# Patient Record
Sex: MALE | Race: AMERICAN INDIAN OR ALASKA NATIVE | HISPANIC OR LATINO | RURAL
[De-identification: names, ages, dates, MRNs, and addresses within clinical notes are randomized per-mention and may not be internally consistent; named-entity substitution may affect disease eponyms.]

---

## 2023-08-13 ENCOUNTER — HOSPITAL ENCOUNTER (EMERGENCY)
Facility: HOSPITAL | Age: 43
Discharge: HOME | End: 2023-08-13
Attending: EMERGENCY MEDICINE
Payer: COMMERCIAL

## 2023-08-13 VITALS
DIASTOLIC BLOOD PRESSURE: 102 MMHG | BODY MASS INDEX: 31.08 KG/M2 | HEART RATE: 104 BPM | WEIGHT: 222 LBS | HEIGHT: 71 IN | TEMPERATURE: 99 F | RESPIRATION RATE: 16 BRPM | SYSTOLIC BLOOD PRESSURE: 157 MMHG | OXYGEN SATURATION: 97 %

## 2023-08-13 DIAGNOSIS — G51.0 BELL'S PALSY: Primary | ICD-10-CM

## 2023-08-13 PROCEDURE — 99284 EMERGENCY DEPT VISIT MOD MDM: CPT

## 2023-08-13 PROCEDURE — 99283 PR EMERGENCY DEPT VISIT,LEVEL III: ICD-10-PCS | Mod: ,,, | Performed by: EMERGENCY MEDICINE

## 2023-08-13 PROCEDURE — 99283 EMERGENCY DEPT VISIT LOW MDM: CPT | Mod: ,,, | Performed by: EMERGENCY MEDICINE

## 2023-08-13 RX ORDER — MINERAL OIL AND PETROLATUM 150; 830 MG/G; MG/G
OINTMENT OPHTHALMIC 4 TIMES DAILY
Qty: 10 G | Refills: 10 | Status: SHIPPED | OUTPATIENT
Start: 2023-08-13

## 2023-08-13 RX ORDER — IRBESARTAN 300 MG/1
300 TABLET ORAL NIGHTLY
COMMUNITY

## 2023-08-13 RX ORDER — AMLODIPINE BESYLATE 5 MG/1
5 TABLET ORAL DAILY
COMMUNITY

## 2023-08-13 RX ORDER — PREDNISONE 20 MG/1
TABLET ORAL
Qty: 18 TABLET | Refills: 0 | Status: SHIPPED | OUTPATIENT
Start: 2023-08-13

## 2023-08-13 RX ORDER — VALACYCLOVIR HYDROCHLORIDE 1 G/1
1000 TABLET, FILM COATED ORAL 3 TIMES DAILY
Qty: 21 TABLET | Refills: 0 | Status: SHIPPED | OUTPATIENT
Start: 2023-08-13 | End: 2023-08-20

## 2023-08-13 RX ORDER — SEMAGLUTIDE 2.68 MG/ML
INJECTION, SOLUTION SUBCUTANEOUS
COMMUNITY

## 2023-08-13 RX ORDER — GLIMEPIRIDE 1 MG/1
1 TABLET ORAL
COMMUNITY

## 2023-08-13 RX ORDER — ATENOLOL 25 MG/1
25 TABLET ORAL DAILY
COMMUNITY

## 2023-08-13 RX ORDER — ASPIRIN 81 MG/1
81 TABLET ORAL DAILY
COMMUNITY

## 2023-08-13 NOTE — ED PROVIDER NOTES
Encounter Date: 8/13/2023    SCRIBE #1 NOTE: I, Brandi Lee, am scribing for, and in the presence of,  Samuel Jaime MD. I have scribed the entire note.       History     Chief Complaint   Patient presents with    Facial Droop     42 y.o. male presents to the ED with c/o right sided facial drooping. Patient stated he woke up this morning and couldn't close his eyes. He tried brushing his teeth and could not gargle water. No other symptoms were reported.     The history is provided by the patient. No  was used.     Review of patient's allergies indicates:  No Known Allergies  No past medical history on file.  No past surgical history on file.  No family history on file.     Review of Systems   Constitutional: Negative.    HENT: Negative.     Eyes: Negative.    Respiratory: Negative.     Cardiovascular: Negative.    Gastrointestinal: Negative.    Endocrine: Negative.    Genitourinary: Negative.    Musculoskeletal: Negative.    Skin: Negative.    Allergic/Immunologic: Negative.    Neurological:         Facial droop    Hematological: Negative.    Psychiatric/Behavioral: Negative.     All other systems reviewed and are negative.      Physical Exam     Initial Vitals [08/13/23 1028]   BP Pulse Resp Temp SpO2   (!) 186/101 99 18 98.9 °F (37.2 °C) 96 %      MAP       --         Physical Exam    Nursing note and vitals reviewed.  Constitutional: He appears well-developed and well-nourished.   Neck:   Normal range of motion.  Cardiovascular:  Normal rate, regular rhythm and normal heart sounds.           Pulmonary/Chest: Breath sounds normal.   Abdominal: Abdomen is soft.   Musculoskeletal:         General: Normal range of motion.      Cervical back: Normal range of motion.     Neurological: He is alert and oriented to person, place, and time.   Right facial pasly with ptosis.   Slight slurred speech.    Skin: Skin is warm.         ED Course   Procedures  Labs Reviewed - No data to display        Imaging Results    None          Medications - No data to display  Medical Decision Making:   Initial Assessment:   RIGHT FACIAL PALSY.    Differential Diagnosis:   DDX:  BELLS PALSY VS CVA VS OTHER  ED Management:  DX:  BELL'S PALSY.  RX:  PREDNISONE AND ANTIVIRAL.              Attending Attestation:           Physician Attestation for Scribe:  Physician Attestation Statement for Scribe #1: I, Samuel Jaime MD, reviewed documentation, as scribed by Brandi Lee in my presence, and it is both accurate and complete.                          Clinical Impression:   Final diagnoses:  [G51.0] Bell's palsy (Primary)        ED Disposition Condition    Discharge Stable          ED Prescriptions       Medication Sig Dispense Start Date End Date Auth. Provider    white petrolatum-mineral oiL (ARTIFICIAL TEARS, JUAN JOSÉ/MIN,) 83-15 % Oint Place into the right eye 4 (four) times daily. 10 g 2023 -- Samuel Jaime MD    valACYclovir (VALTREX) 1000 MG tablet () Take 1 tablet (1,000 mg total) by mouth 3 (three) times daily. for 7 days 21 tablet 2023 Samuel Jaime MD    predniSONE (DELTASONE) 20 MG tablet 3 PO DAILY X 3 DAYS, THEN  2 PO DAILY X 3 DAYS, THEN 1 PO DAILY X 3 DAYS, THEN STOP. 18 tablet 2023 -- Samuel Jaime MD          Follow-up Information       Follow up With Specialties Details Why Contact Info    PRIMARY CARE PROVIDER   As needed              Samuel Jaime MD  [id number]

## 2023-08-13 NOTE — DISCHARGE INSTRUCTIONS
TAKE PREDNISONE AND VALCYCLOVIR AS DIRECTED.  USE ARTIFICIAL TEAR 4 TIMES DAILY AND AT BEDTIMES.  FOLLOW UP WITH YOUR PRIMARY CARE PROVIDER.  RETURN TO THE EMERGENCY DEPARTMENT AS NEEDED.